# Patient Record
Sex: FEMALE | Race: WHITE | Employment: UNEMPLOYED | URBAN - METROPOLITAN AREA
[De-identification: names, ages, dates, MRNs, and addresses within clinical notes are randomized per-mention and may not be internally consistent; named-entity substitution may affect disease eponyms.]

---

## 2018-11-11 ENCOUNTER — HOSPITAL ENCOUNTER (EMERGENCY)
Age: 28
Discharge: HOME | End: 2018-11-11
Attending: EMERGENCY MEDICINE
Payer: MEDICAID

## 2018-11-11 VITALS
BODY MASS INDEX: 34.53 KG/M2 | SYSTOLIC BLOOD PRESSURE: 138 MMHG | RESPIRATION RATE: 18 BRPM | OXYGEN SATURATION: 98 % | DIASTOLIC BLOOD PRESSURE: 96 MMHG | WEIGHT: 220 LBS | HEIGHT: 67 IN | TEMPERATURE: 98.9 F | HEART RATE: 89 BPM

## 2018-11-11 DIAGNOSIS — R23.8 VESICULAR RASH: Primary | ICD-10-CM

## 2018-11-11 LAB
ALBUMIN SERPL-MCNC: 3.7 G/DL (ref 3.5–5)
ALBUMIN/GLOB SERPL: 0.9 {RATIO} (ref 1.2–3.5)
ALP SERPL-CCNC: 93 U/L (ref 50–136)
ALT SERPL-CCNC: 41 U/L (ref 12–65)
ANION GAP SERPL CALC-SCNC: 8 MMOL/L (ref 7–16)
AST SERPL-CCNC: 36 U/L (ref 15–37)
BASOPHILS # BLD: 0 K/UL (ref 0–0.2)
BASOPHILS NFR BLD: 0 % (ref 0–2)
BILIRUB SERPL-MCNC: 0.4 MG/DL (ref 0.2–1.1)
BUN SERPL-MCNC: 7 MG/DL (ref 6–23)
CALCIUM SERPL-MCNC: 8.7 MG/DL (ref 8.3–10.4)
CHLORIDE SERPL-SCNC: 107 MMOL/L (ref 98–107)
CO2 SERPL-SCNC: 23 MMOL/L (ref 21–32)
CREAT SERPL-MCNC: 0.83 MG/DL (ref 0.6–1)
DIFFERENTIAL METHOD BLD: ABNORMAL
EOSINOPHIL # BLD: 0.1 K/UL (ref 0–0.8)
EOSINOPHIL NFR BLD: 2 % (ref 0.5–7.8)
ERYTHROCYTE [DISTWIDTH] IN BLOOD BY AUTOMATED COUNT: 14.9 %
GLOBULIN SER CALC-MCNC: 4.1 G/DL (ref 2.3–3.5)
GLUCOSE SERPL-MCNC: 79 MG/DL (ref 65–100)
HCT VFR BLD AUTO: 38.2 % (ref 35.8–46.3)
HGB BLD-MCNC: 12.5 G/DL (ref 11.7–15.4)
IMM GRANULOCYTES # BLD: 0 K/UL (ref 0–0.5)
IMM GRANULOCYTES NFR BLD AUTO: 0 % (ref 0–5)
LACTATE SERPL-SCNC: 0.6 MMOL/L (ref 0.4–2)
LYMPHOCYTES # BLD: 1.3 K/UL (ref 0.5–4.6)
LYMPHOCYTES NFR BLD: 27 % (ref 13–44)
MCH RBC QN AUTO: 28.1 PG (ref 26.1–32.9)
MCHC RBC AUTO-ENTMCNC: 32.7 G/DL (ref 31.4–35)
MCV RBC AUTO: 85.8 FL (ref 79.6–97.8)
MONOCYTES # BLD: 0.7 K/UL (ref 0.1–1.3)
MONOCYTES NFR BLD: 14 % (ref 4–12)
NEUTS SEG # BLD: 2.8 K/UL (ref 1.7–8.2)
NEUTS SEG NFR BLD: 57 % (ref 43–78)
NRBC # BLD: 0 K/UL (ref 0–0.2)
PLATELET # BLD AUTO: 201 K/UL (ref 150–450)
PMV BLD AUTO: 10.4 FL (ref 9.4–12.3)
POTASSIUM SERPL-SCNC: 3.7 MMOL/L (ref 3.5–5.1)
PROT SERPL-MCNC: 7.8 G/DL (ref 6.3–8.2)
RBC # BLD AUTO: 4.45 M/UL (ref 4.05–5.2)
SODIUM SERPL-SCNC: 138 MMOL/L (ref 136–145)
WBC # BLD AUTO: 4.9 K/UL (ref 4.3–11.1)

## 2018-11-11 PROCEDURE — 83605 ASSAY OF LACTIC ACID: CPT

## 2018-11-11 PROCEDURE — 85025 COMPLETE CBC W/AUTO DIFF WBC: CPT

## 2018-11-11 PROCEDURE — 80053 COMPREHEN METABOLIC PANEL: CPT

## 2018-11-11 PROCEDURE — 99282 EMERGENCY DEPT VISIT SF MDM: CPT | Performed by: EMERGENCY MEDICINE

## 2018-11-11 PROCEDURE — 87798 DETECT AGENT NOS DNA AMP: CPT

## 2018-11-11 RX ORDER — HYDROXYZINE PAMOATE 25 MG/1
25 CAPSULE ORAL
Qty: 30 CAP | Refills: 0 | Status: SHIPPED | OUTPATIENT
Start: 2018-11-11

## 2018-11-11 RX ORDER — ACYCLOVIR 50 MG/G
CREAM TOPICAL
Qty: 5 G | Refills: 0 | Status: SHIPPED | OUTPATIENT
Start: 2018-11-11

## 2018-11-11 RX ORDER — VALACYCLOVIR HYDROCHLORIDE 1 G/1
1000 TABLET, FILM COATED ORAL 3 TIMES DAILY
Qty: 30 TAB | Refills: 0 | Status: SHIPPED | OUTPATIENT
Start: 2018-11-11 | End: 2018-11-21

## 2018-11-11 NOTE — DISCHARGE INSTRUCTIONS
Chickenpox: Care Instructions  Your Care Instructions  Chickenpox is a common disease caused by the varicella virus. Chickenpox causes an itchy rash and red spots or blisters (pox) on the skin all over the body. You also can have blisters on the scalp and in the eye. But getting the chickenpox vaccine can lower your risk of getting this disease. Chickenpox is most contagious from 2 to 3 days before the rash develops until no new blisters form and all the blisters have crusted over. That may be 7 days or more after the blisters first appear. It may take up to 2 weeks for the scabs to go away. Most people feel better within a week. You can care for yourself at home. Follow-up care is a key part of your treatment and safety. Be sure to make and go to all appointments, and call your doctor if you are having problems. It's also a good idea to know your test results and keep a list of the medicines you take. How can you care for yourself at home? · Get plenty of rest.  · Take warm or cool baths with oatmeal bath products, such as Aveeno. This will reduce itching. You can also add a handful of oatmeal (ground to a powder) to your bath. After your bath, pat, rather than rub, your skin dry. · Take an over-the-counter pain medicine, such as acetaminophen (Tylenol), ibuprofen (Advil or Motrin), or naproxen (Aleve) to reduce fever and discomfort. Read and follow all instructions on the label. No one younger than 20 should take aspirin. It has been linked to Reye syndrome, a serious illness. · Take your medicines exactly as prescribed. Call your doctor if you think you are having a problem with your medicine. · Try not to scratch the chickenpox rash. · Wet a soft cloth with cool water alone or cool water mixed with baking soda. Put the cool compress directly on the skin to cool your skin and relieve itching. · Use soothing lotions that can help dry chickenpox blisters, such as those that contain:  ?  Phenol, menthol, and camphor, such as calamine lotion. ? Oatmeal, such as Aveeno Lotion. · Do not use lotions or creams that contain antihistamines, such as diphenhydramine (Benadryl). · Try not to get hot and sweaty, because it will make you itch more. When should you call for help? Call your doctor now or seek immediate medical care if:    · You have a new or worsening cough, and you are short of breath.     · You have a fever with a stiff neck or a severe headache.     · You are sensitive to light or feel very sleepy or confused.     · You have eye pain or drainage.     · You have signs of an infection, such as:  ? Increased pain, swelling, warmth, or redness. ? Red streaks leading from the chickenpox blisters. ? Pus draining from the blisters. ? A fever.    Watch closely for changes in your health, and be sure to contact your doctor if:    · You do not get better as expected. Where can you learn more? Go to http://zaid-regino.info/. Enter U527 in the search box to learn more about \"Chickenpox: Care Instructions. \"  Current as of: March 28, 2018  Content Version: 11.8  © 4358-2847 [employer]. Care instructions adapted under license by [employer] (which disclaims liability or warranty for this information). If you have questions about a medical condition or this instruction, always ask your healthcare professional. [place] [address] any warranty or liability for your use of this information.

## 2018-11-11 NOTE — ED NOTES
I have reviewed discharge instructions with the patient. The patient verbalized understanding. Patient left ED via Discharge Method: ambulatory to Home with (SELF). Opportunity for questions and clarification provided. Patient given 3 scripts. To continue your aftercare when you leave the hospital, you may receive an automated call from our care team to check in on how you are doing. This is a free service and part of our promise to provide the best care and service to meet your aftercare needs.  If you have questions, or wish to unsubscribe from this service please call [phone]. Thank you for Choosing our [facility] Emergency Department.

## 2018-11-11 NOTE — ED PROVIDER NOTES
Patient is here with an itchy, painful, burning rash that started 2 days ago. She started on Thursday, the day before with a fever of 100-101. She still have the fever on Friday. 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 The rash is now spreading to the rest of her body. She thinks she did have chickenpox when she was a child. She is not having any changes in her vision, headache, nausea, vomiting, chest pain, shortness of breath, swelling to her arms or legs, weakness, dizziness, trouble with urination or bowel movements or other new symptoms. She was ambulatory to the room without difficulty and well hydrated. The history is provided by the patient. Rash This is a new problem. The current episode started 2 days ago. The problem has been gradually worsening. The problem is associated with an unknown factor. There has been a fever of 100 - 100.9 F. The rash is present on the scalp, face, neck, chest, back, abdomen, left arm, right arm and right lower leg. The pain is at a severity of 5/10. The pain is moderate. The pain has been constant since onset. Associated symptoms include blisters, itching and pain. She has tried nothing for the symptoms. No past medical history on file. No past surgical history on file. No family history on file. Social History Socioeconomic History  Marital status: SINGLE Spouse name: Not on file  Number of children: Not on file  Years of education: Not on file  Highest education level: Not on file Social Needs  Financial resource strain: Not on file  Food insecurity - worry: Not on file  Food insecurity - inability: Not on file  Transportation needs - medical: Not on file  Transportation needs - non-medical: Not on file Occupational History  Not on file Tobacco Use  Smoking status: Not on file Substance and Sexual Activity  Alcohol use: Not on file  Drug use: Not on file  Sexual activity: Not on file Other Topics Concern  Not on file Social History Narrative  Not on file ALLERGIES: Pcn [penicillins] Review of Systems Constitutional: Negative. HENT: Negative. Eyes: Negative. Respiratory: Negative. Cardiovascular: Negative. Gastrointestinal: Negative. Genitourinary: Negative. Musculoskeletal: Negative. Skin: Positive for itching and rash. Neurological: Negative. Psychiatric/Behavioral: Negative. All other systems reviewed and are negative. Vitals:  
 11/11/18 0945 BP: (!) 138/96 Pulse: 89 Resp: 18 Temp: 98.9 °F (37.2 °C) SpO2: 98% Weight: 99.8 kg (220 lb) Height: 5' 7\" (1.702 m) Physical Exam  
Constitutional: She is oriented to person, place, and time. She appears well-developed and well-nourished. HENT:  
Head: Normocephalic and atraumatic. Right Ear: External ear normal.  
Left Ear: External ear normal.  
Nose: Nose normal.  
Mouth/Throat: Oropharynx is clear and moist.  
Eyes: Conjunctivae and EOM are normal. Pupils are equal, round, and reactive to light. Neck: Normal range of motion. Neck supple. Cardiovascular: Normal rate, regular rhythm, normal heart sounds and intact distal pulses. Pulmonary/Chest: Effort normal and breath sounds normal.  
Abdominal: Soft. Bowel sounds are normal.  
Musculoskeletal: Normal range of motion. Neurological: She is alert and oriented to person, place, and time. She has normal reflexes. Skin: Skin is warm and dry. Rash noted. There are erythematous, tender, vesicular looking lesions that are generalized on her body on both sides. They are in different stages. There is one lesion in her mouth that is erythematous but no swelling of the tonsils. She does have a tender lymph node in the anterior cervical chain on the right. Ear exam is normal.  
Psychiatric: She has a normal mood and affect.  Her behavior is normal. Judgment and thought content normal.  
Nursing note and vitals reviewed. MDM Number of Diagnoses or Management Options Vesicular rash:  
  
Amount and/or Complexity of Data Reviewed Clinical lab tests: ordered and reviewed Risk of Complications, Morbidity, and/or Mortality Presenting problems: moderate Diagnostic procedures: moderate Management options: moderate Patient Progress Patient progress: stable Procedures The patient was observed in the ED. Results Reviewed: 
 
 
Recent Results (from the past 24 hour(s)) CBC WITH AUTOMATED DIFF Collection Time: 11/11/18 10:32 AM  
Result Value Ref Range WBC 4.9 4.3 - 11.1 K/uL  
 RBC 4.45 4.05 - 5.2 M/uL  
 HGB 12.5 11.7 - 15.4 g/dL HCT 38.2 35.8 - 46.3 % MCV 85.8 79.6 - 97.8 FL  
 MCH 28.1 26.1 - 32.9 PG  
 MCHC 32.7 31.4 - 35.0 g/dL  
 RDW 14.9 % PLATELET 653 740 - 104 K/uL MPV 10.4 9.4 - 12.3 FL ABSOLUTE NRBC 0.00 0.0 - 0.2 K/uL  
 DF AUTOMATED NEUTROPHILS 57 43 - 78 % LYMPHOCYTES 27 13 - 44 % MONOCYTES 14 (H) 4.0 - 12.0 % EOSINOPHILS 2 0.5 - 7.8 % BASOPHILS 0 0.0 - 2.0 % IMMATURE GRANULOCYTES 0 0.0 - 5.0 %  
 ABS. NEUTROPHILS 2.8 1.7 - 8.2 K/UL  
 ABS. LYMPHOCYTES 1.3 0.5 - 4.6 K/UL  
 ABS. MONOCYTES 0.7 0.1 - 1.3 K/UL  
 ABS. EOSINOPHILS 0.1 0.0 - 0.8 K/UL  
 ABS. BASOPHILS 0.0 0.0 - 0.2 K/UL  
 ABS. IMM. GRANS. 0.0 0.0 - 0.5 K/UL METABOLIC PANEL, COMPREHENSIVE Collection Time: 11/11/18 10:32 AM  
Result Value Ref Range Sodium 138 136 - 145 mmol/L Potassium 3.7 3.5 - 5.1 mmol/L Chloride 107 98 - 107 mmol/L  
 CO2 23 21 - 32 mmol/L Anion gap 8 7 - 16 mmol/L Glucose 79 65 - 100 mg/dL BUN 7 6 - 23 MG/DL Creatinine 0.83 0.6 - 1.0 MG/DL  
 GFR est AA >60 >60 ml/min/1.73m2 GFR est non-AA >60 >60 ml/min/1.73m2 Calcium 8.7 8.3 - 10.4 MG/DL  Bilirubin, total 0.4 0.2 - 1.1 MG/DL  
 ALT (SGPT) 41 12 - 65 U/L  
 AST (SGOT) 36 15 - 37 U/L  
 Alk. phosphatase 93 50 - 136 U/L Protein, total 7.8 6.3 - 8.2 g/dL Albumin 3.7 3.5 - 5.0 g/dL Globulin 4.1 (H) 2.3 - 3.5 g/dL A-G Ratio 0.9 (L) 1.2 - 3.5 LACTIC ACID Collection Time: 11/11/18 10:32 AM  
Result Value Ref Range Lactic acid 0.6 0.4 - 2.0 MMOL/L  
 
VZV PCR is pending at [facility]. This rash does appear to be chickenpox in nature. She has been run down, her father is in the hospital and she works 12 hour shifts. She does have 3 children as well. She is not having any further fevers, eye or neurologic involvement and is stable for discharge at this time. I have written a prescription for Valtrex, acyclovir cream and Vistaril for the itching. She should return to the ED if worsening and we will call her with the test results if positive. She was instructed to try and stay away from her children as well as she could be contagious and a note for work was written. I discussed the results of all labs, procedures, radiographs, and treatments with the patient and available family. Treatment plan is agreed upon and the patient is ready for discharge. All voiced understanding of the discharge plan and medication instructions or changes as appropriate. Questions about treatment in the ED were answered. All were encouraged to return should symptoms worsen or new problems develop.

## 2018-11-11 NOTE — LETTER
[address] [facility] EMERGENCY DEPT [address] [id number] 
[phone] Work/School Note Date: 11/11/2018 To Whom It May concern: Leona Thompson was seen and treated today in the emergency room by the following provider(s): 
Attending Provider: Anastasiya Soliman MD 
Physician Assistant: CANDY Padron. Leona Thompson may return to work on 11/17/18. Sincerely, CANDY Alicea

## 2018-11-15 LAB — VZV DNA DETEC, PCR,XVZVPT: NORMAL

## 2021-01-01 NOTE — ED TRIAGE NOTES
Pt complains of rash to face, chest, neck and top of foot. states she Friday she ran a fever and she woke up with bumps. States she has pain under right ear that radiates to the back of her neck.
No